# Patient Record
Sex: FEMALE | Race: WHITE
[De-identification: names, ages, dates, MRNs, and addresses within clinical notes are randomized per-mention and may not be internally consistent; named-entity substitution may affect disease eponyms.]

---

## 2018-09-17 ENCOUNTER — HOSPITAL ENCOUNTER (EMERGENCY)
Dept: HOSPITAL 97 - ER | Age: 23
Discharge: HOME | End: 2018-09-17
Payer: COMMERCIAL

## 2018-09-17 VITALS — SYSTOLIC BLOOD PRESSURE: 125 MMHG | DIASTOLIC BLOOD PRESSURE: 61 MMHG | TEMPERATURE: 98 F | OXYGEN SATURATION: 99 %

## 2018-09-17 DIAGNOSIS — O26.891: Primary | ICD-10-CM

## 2018-09-17 LAB
BUN BLD-MCNC: 10 MG/DL (ref 7–18)
GLUCOSE SERPLBLD-MCNC: 90 MG/DL (ref 74–106)
HCG SERPL-ACNC: (no result) MIU/ML (ref 1–3)
HCT VFR BLD CALC: 40.1 % (ref 36–45)
LYMPHOCYTES # SPEC AUTO: 1.9 K/UL (ref 0.7–4.9)
MCH RBC QN AUTO: 28.6 PG (ref 27–35)
MCV RBC: 84.1 FL (ref 80–100)
PMV BLD: 9.4 FL (ref 7.6–11.3)
POTASSIUM SERPL-SCNC: 4.1 MMOL/L (ref 3.5–5.1)
RBC # BLD: 4.76 M/UL (ref 3.86–4.86)

## 2018-09-17 PROCEDURE — 81003 URINALYSIS AUTO W/O SCOPE: CPT

## 2018-09-17 PROCEDURE — 84702 CHORIONIC GONADOTROPIN TEST: CPT

## 2018-09-17 PROCEDURE — 36415 COLL VENOUS BLD VENIPUNCTURE: CPT

## 2018-09-17 PROCEDURE — 99284 EMERGENCY DEPT VISIT MOD MDM: CPT

## 2018-09-17 PROCEDURE — 80048 BASIC METABOLIC PNL TOTAL CA: CPT

## 2018-09-17 PROCEDURE — 81025 URINE PREGNANCY TEST: CPT

## 2018-09-17 PROCEDURE — 76817 TRANSVAGINAL US OBSTETRIC: CPT

## 2018-09-17 PROCEDURE — 86901 BLOOD TYPING SEROLOGIC RH(D): CPT

## 2018-09-17 PROCEDURE — 96374 THER/PROPH/DIAG INJ IV PUSH: CPT

## 2018-09-17 PROCEDURE — 86900 BLOOD TYPING SEROLOGIC ABO: CPT

## 2018-09-17 PROCEDURE — 85025 COMPLETE CBC W/AUTO DIFF WBC: CPT

## 2018-09-17 NOTE — EDPHYS
Physician Documentation                                                                           

 [facility]                                                                

Name: Rahul Cole                                                                              

Age: 22 yrs                                                                                       

Sex: Female                                                                                       

: 1995                                                                                   

MRN: [id number]                                                                                   

Arrival Date: 2018                                                                          

Time: 10:50                                                                                       

Account#: [id number]                                                                            

Bed 13                                                                                            

Private MD: Out, [facility]                                                                    

ED Physician Talat Uriarte                                                                      

HPI:                                                                                              

                                                                                             

11:22 This 22 yrs old  Female presents to ER via Ambulatory with complaints of       kb  

      Abdominal Cramping - 6WK PG.                                                                

11:22 The patient presents to the emergency department with abdominal pain, of the right      kb  

      lower quadrant and left lower quadrant, that started this morning, described as crampy.     

      The estimated gestational age is 6 weeks. Pregnancy course: Prenatal care: private OB       

      physician, Leakage of Fluid: none appreciated, Ultrasound: the patient has not had an       

      ultrasound, Risk/complications: no obvious risks or complications are appreciated.          

      Previous pregnancies: the patient has never been pregnant. Associated signs and             

      symptoms: Pertinent positives: abdominal pain, nausea, Pertinent negatives: chest pain,     

      diarrhea, dysuria, fever, frequency, ruptured membranes, seizure, shortness of breath,      

      vaginal bleeding, vaginal discharge, vomiting. The patient has not experienced similar      

      symptoms in the past. The patient has not recently seen a physician.                        

                                                                                                  

OB/GYN:                                                                                           

10:56  1, Full Term 0, Premature 0,  0, Living 0, LMP 2018                 iw  

11:22  1,  0, Living 0, LMP 2018                                           kb  

                                                                                                  

Historical:                                                                                       

- Allergies:                                                                                      

10:58 NKA;                                                                                    iw  

- Home Meds:                                                                                      

10:58 None [Active];                                                                          iw  

- PMHx:                                                                                           

10:58 None;                                                                                   iw  

- PSHx:                                                                                           

10:58 Tonsillectomy;                                                                          iw  

                                                                                                  

- Immunization history:: Adult Immunizations not up to date.                                      

- Social history:: Smoking status: Patient/guardian denies using tobacco.                         

- Ebola Screening: : Patient negative for fever greater than or equal to 101.5 degrees            

  Fahrenheit, and additional compatible Ebola Virus Disease symptoms Patient denies               

  exposure to infectious person Patient denies travel to an Ebola-affected area in the            

  21 days before illness onset No symptoms or risks identified at this time.                      

                                                                                                  

                                                                                                  

ROS:                                                                                              

11:22 Constitutional: Negative for fever, chills, and weight loss, Cardiovascular: Negative   kb  

      for chest pain, palpitations, and edema, Respiratory: Negative for shortness of breath,     

      cough, wheezing, and pleuritic chest pain, Back: Negative for injury and pain, :          

      Negative for injury, bleeding, discharge, and swelling, MS/Extremity: Negative for          

      injury and deformity, Skin: Negative for injury, rash, and discoloration, Neuro:            

      Negative for headache, weakness, numbness, tingling, and seizure.                           

11:22 Abdomen/GI: Positive for abdominal pain, nausea, abdominal cramps, Negative for             

      vomiting, diarrhea, constipation, abdominal distension, anorexia.                           

                                                                                                  

Exam:                                                                                             

11:22 Constitutional:  This is a well developed, well nourished patient who is awake, alert,  kb  

      and in no acute distress. Head/Face:  Normocephalic, atraumatic. Chest/axilla:  Normal      

      chest wall appearance and motion.  Nontender with no deformity.  No lesions are             

      appreciated. Cardiovascular:  Regular rate and rhythm with a normal S1 and S2.  No          

      gallops, murmurs, or rubs.  Normal PMI, no JVD.  No pulse deficits. Respiratory:  Lungs     

      have equal breath sounds bilaterally, clear to auscultation and percussion.  No rales,      

      rhonchi or wheezes noted.  No increased work of breathing, no retractions or nasal          

      flaring. Back:  No spinal tenderness.  No costovertebral tenderness.  Full range of         

      motion. Skin:  Warm, dry with normal turgor.  Normal color with no rashes, no lesions,      

      and no evidence of cellulitis. MS/ Extremity:  Pulses equal, no cyanosis.                   

      Neurovascular intact.  Full, normal range of motion. Neuro:  Awake and alert, GCS 15,       

      oriented to person, place, time, and situation.  Cranial nerves II-XII grossly intact.      

      Motor strength 5/5 in all extremities.  Sensory grossly intact.  Cerebellar exam            

      normal.  Normal gait.                                                                       

11:22 Abdomen/GI: Inspection: abdomen appears normal, Bowel sounds: normal, in all quadrants,     

      Palpation: soft, in all quadrants, mild abdominal tenderness, in the suprapubic area,       

      right lower quadrant and left lower quadrant.                                               

                                                                                                  

Vital Signs:                                                                                      

10:56  / 94; Pulse 85; Resp 16; Temp 97.9; Pulse Ox 98% on R/A; Weight 95.71 kg; Height iw  

      5 ft. 7 in. (170.18 cm); Pain 5/10;                                                         

12:30  / 61; Pulse 78; Resp 18; Temp 98.0; Pulse Ox 99% on R/A;                         ph  

10:56 Body Mass Index 33.05 (95.71 kg, 170.18 cm)                                             iw  

                                                                                                  

MDM:                                                                                              

10:54 Patient medically screened.                                                             [facility] 

11:22 Data reviewed: vital signs, nurses notes. Data interpreted: Pulse oximetry: on room air kb  

      is 98 %. Interpretation: normal.                                                            

12:43 Counseling: I had a detailed discussion with the patient and/or guardian regarding: the kb  

      historical points, exam findings, and any diagnostic results supporting the                 

      discharge/admit diagnosis, lab results, radiology results, the need for outpatient          

      follow up, an OB/Gyne specialist, to return to the emergency department if symptoms         

      worsen or persist or if there are any questions or concerns that arise at home.             

                                                                                                  

                                                                                             

10:55 Order name: Quantitative Hcg; Complete Time: 12:09                                      kb  

                                                                                             

10:55 Order name: Abo/rh Typing; Complete Time: 12:40                                         kb  

                                                                                             

10:55 Order name: Basic Metabolic Panel; Complete Time: 12:09                                 kb  

                                                                                             

10:55 Order name: CBC with Diff; Complete Time: 11:33                                         kb  

                                                                                             

11:34 Order name: Urine Dipstick--Ancillary (enter results); Complete Time: 11:58             bd  

                                                                                             

11:34 Order name: Urine Pregnancy--Ancillary (enter results); Complete Time: 11:58            bd  

                                                                                             

10:55 Order name: Urine Pregnancy Test (obtain specimen); Complete Time: 11:11                kb  

                                                                                             

10:55 Order name: IV Saline Lock; Complete Time: 11:11                                        kb  

                                                                                             

10:55 Order name: Labs collected and sent; Complete Time: 11:11                               kb  

                                                                                             

10:55 Order name: NPO; Complete Time: 11:11                                                   kb  

                                                                                             

10:55 Order name: Urine Dipstick-Ancillary (obtain specimen); Complete Time: 11:11            kb  

                                                                                             

12:12 Order name: US Transvaginal Ob; Complete Time: 13:17                                    kb  

                                                                                                  

Administered Medications:                                                                         

11:25 Drug: Phenergan 12.5 mg Route: IVP; Site: right antecubital;                            ph  

13:10 Follow up: Response: No adverse reaction; Nausea is decreased                           ph  

                                                                                                  

                                                                                                  

Disposition:                                                                                      

14:53 Co-signature as Attending Physician, Talat Uriarte MD I agree with the assessment and  [facility] 

      plan of care.                                                                               

                                                                                                  

Disposition:                                                                                      

18 12:45 Discharged to Home. Impression: Less than 8 weeks gestation of pregnancy.          

- Condition is Stable.                                                                            

- Discharge Instructions: First Trimester of Pregnancy, Easy-to-Read.                             

- Prescriptions for Diclegis 10- 10 mg Oral tablet,delayed release (DR/EC) - take 1               

  tablet by ORAL route once daily; 10 tablet.                                                     

- Medication Reconciliation Form, Thank You Letter, Antibiotic Education, Prescription            

  Opioid Use form.                                                                                

- Follow up: Private Physician; When: 2 - 3 days; Reason: Recheck today's complaints,             

  Continuance of care, Re-evaluation by your physician. Follow up: Emergency                      

  Department; When: As needed; Reason: Worsening of condition.                                    

                                                                                                  

                                                                                                  

                                                                                                  

Signatures:                                                                                       

Dispatcher [employer]                           EDMS                                                 

Jackie Lozada, FNP-C                 FNP-Talat Ledezma MD MD cha Williams, Irene, SERGE                     RN                                                      

Iesha Feliz RN                      RN                                                      

                                                                                                  

Corrections: (The following items were deleted from the chart)                                    

13:11 12:45 2018 12:45 Discharged to Home. Impression: Less than 8 weeks gestation of   ph  

      pregnancy. Condition is Stable. Discharge Instructions: First Trimester of Pregnancy,       

      Easy-to-Read. Forms are Medication Reconciliation Form, Thank You Letter, Antibiotic        

      Education, Prescription Opioid Use. Follow up: Private Physician; When: 2 - 3 days;         

      Reason: Recheck today's complaints, Continuance of care, Re-evaluation by your              

      physician. Follow up: Emergency Department; When: As needed; Reason: Worsening of           

      condition. kb                                                                               

                                                                                                  

**************************************************************************************************

## 2018-09-17 NOTE — ER
Nurse's Notes                                                                                     

 [facility]                                                                

Name: Rahul Cole                                                                              

Age: 22 yrs                                                                                       

Sex: Female                                                                                       

: 1995                                                                                   

MRN: [id number]                                                                                   

Arrival Date: 2018                                                                          

Time: 10:50                                                                                       

Account#: [id number]                                                                            

Bed 13                                                                                            

Private MD: Out, [facility]                                                                    

Diagnosis: Less than 8 weeks gestation of pregnancy                                               

                                                                                                  

Presentation:                                                                                     

                                                                                             

10:56 Presenting complaint: Patient states: is approx 6 weeks pregnant, started having lower  iw  

      abd cramping and nausea this morning X 2 hours, denies vaginal bleeding. Transition of      

      care: patient was not received from another setting of care. Onset of symptoms was          

      2018. Risk Assessment: Do you want to hurt yourself or someone else?          

      Patient reports no desire to harm self or others. Initial Sepsis Screen: Does the           

      patient meet any 2 criteria? No. Patient's initial sepsis screen is negative. Does the      

      patient have a suspected source of infection? No. Patient's initial sepsis screen is        

      negative. Care prior to arrival: None.                                                      

10:56 Method Of Arrival: Ambulatory                                                           iw  

10:56 Acuity: MALATHI 3                                                                           iw  

                                                                                                  

OB/GYN:                                                                                           

10:56  1, Full Term 0, Premature 0,  0, Living 0, LMP 2018                 iw  

11:22  1,  0, Living 0, LMP 2018                                           kb  

                                                                                                  

Historical:                                                                                       

- Allergies:                                                                                      

10:58 NKA;                                                                                    iw  

- Home Meds:                                                                                      

10:58 None [Active];                                                                          iw  

- PMHx:                                                                                           

10:58 None;                                                                                   iw  

- PSHx:                                                                                           

10:58 Tonsillectomy;                                                                          iw  

                                                                                                  

- Immunization history:: Adult Immunizations not up to date.                                      

- Social history:: Smoking status: Patient/guardian denies using tobacco.                         

- Ebola Screening: : Patient negative for fever greater than or equal to 101.5 degrees            

  Fahrenheit, and additional compatible Ebola Virus Disease symptoms Patient denies               

  exposure to infectious person Patient denies travel to an Ebola-affected area in the            

  21 days before illness onset No symptoms or risks identified at this time.                      

                                                                                                  

                                                                                                  

Screenin:11 Abuse screen: Denies threats or abuse. Denies injuries from another. Nutritional        ph  

      screening: No deficits noted. Tuberculosis screening: No symptoms or risk factors           

      identified. Fall Risk None identified.                                                      

                                                                                                  

Assessment:                                                                                       

11:12 General: Appears in no apparent distress. comfortable, well groomed, Behavior is calm,  ph  

      cooperative, appropriate for age. Pain: Complains of pain in suprapubic area. Neuro:        

      Level of Consciousness is awake, alert, obeys commands, Oriented to person, place,          

      time, situation. Cardiovascular: Capillary refill < 3 seconds Patient's skin is warm        

      and dry. Respiratory: Airway is patent Respiratory effort is even, unlabored. GI:           

      Abdomen is non-distended, obese, Bowel sounds present X 4 quads. Abd is soft and non        

      tender X 4 quads. Reports lower abdominal pain, nausea, Patient currently denies            

      constipation, diarrhea, vomiting. : Reports cramping, Denies discharge, vaginal           

      bleeding. Derm: Skin is intact, is healthy with good turgor, Skin is pink, warm \T\ dry.    

      Musculoskeletal: Circulation, motion, and sensation intact. Range of motion: intact in      

      all extremities.                                                                            

12:30 Reassessment: Patient appears in no apparent distress at this time. Patient and/or      ph  

      family updated on plan of care and expected duration. Pain level reassessed. Patient is     

      alert, oriented x 3, equal unlabored respirations, skin warm/dry/pink. Pt resting           

      quietly, awaiting US results.                                                               

13:10 Reassessment: Patient appears in no apparent distress at this time. Patient and/or      ph  

      family updated on plan of care and expected duration. Pain level reassessed. Patient is     

      alert, oriented x 3, equal unlabored respirations, skin warm/dry/pink. Pt d/c home.         

                                                                                                  

Vital Signs:                                                                                      

10:56  / 94; Pulse 85; Resp 16; Temp 97.9; Pulse Ox 98% on R/A; Weight 95.71 kg; Height iw  

      5 ft. 7 in. (170.18 cm); Pain 5/10;                                                         

12:30  / 61; Pulse 78; Resp 18; Temp 98.0; Pulse Ox 99% on R/A;                         ph  

10:56 Body Mass Index 33.05 (95.71 kg, 170.18 cm)                                             iw  

                                                                                                  

ED Course:                                                                                        

10:50 Patient arrived in ED.                                                                  sb2 

10:50 [facility]                                                        kb  

10:50 Talat Uriarte MD is Attending Physician.                                             kb  

10:50 Out, [facility] is Private Physician.                                                      sb2 

10:56 Iesha Feliz RN is Primary Nurse.                                                    ph  

10:57 Triage completed.                                                                       iw  

10:58 Arm band placed on.                                                                     iw  

11:11 Patient has correct armband on for positive identification. Bed in low position. Call   ph  

      light in reach. Side rails up X 1. Pulse ox on. NIBP on. Warm blanket given.                

11:17 Initial lab(s) drawn, by me, sent to lab. Inserted saline lock: 20 gauge in right       dh3 

      antecubital area, using aseptic technique. Blood collected.                                 

11:21 Urine collected: clean catch specimen, clear.                                           dh3 

12:31 US Transvaginal Ob In Process Unspecified.                                              EDMS

12:48 Ultrasound completed. Patient tolerated well. Patient moved back from ultrasound.       lc3 

13:02 No provider procedures requiring assistance completed. IV discontinued, intact,         ph  

      bleeding controlled, No redness/swelling at site. Pressure dressing applied.                

                                                                                                  

Administered Medications:                                                                         

11:25 Drug: Phenergan 12.5 mg Route: IVP; Site: right antecubital;                            ph  

13:10 Follow up: Response: No adverse reaction; Nausea is decreased                           ph  

                                                                                                  

                                                                                                  

Outcome:                                                                                          

12:45 Discharge ordered by MD.                                                                kb  

13:10 Discharged to home ambulatory, with significant other.                                  ph  

13:10 Condition: good                                                                             

13:10 Discharge instructions given to patient, Instructed on discharge instructions, follow       

      up and referral plans. medication usage, Demonstrated understanding of instructions,        

      follow-up care, medications, Prescriptions given X 1.                                       

13:11 Patient left the ED.                                                                    ph  

                                                                                                  

Signatures:                                                                                       

Dispatcher [employer]                           EDMS                                                 

Jackie Lozada, FNP-C                 FNP-Ckb                                                   

Efra Carrion RN RN   sg                                                   

Anabelle Cooper RN RN                                                      

Iesha Feliz RN                      RN                                                      

Yusra Weiss Deanna                              [facility]                                                  

Patti Hopson2                                                  

                                                                                                  

Corrections: (The following items were deleted from the chart)                                    

10:58 10:56 Temp 97.9F; sg                                                                    iw  

                                                                                                  

**************************************************************************************************

## 2018-09-17 NOTE — RAD REPORT
EXAM DESCRIPTION:  US - Transvaginal OB - 9/17/2018 12:43 pm

 

CLINICAL HISTORY:  ABD CRAMPING, PREGNANT

 

COMPARISON:  No comparisons

 

FINDINGS:  A single gestational sac is seen within the uterus. The shape of the sac is within normal 
limits for gestational age. Within the sac is a single fetal pole with crown-rump length of 3 mm, cor
relating to estimated gestational age of 5 weeks 6 days. Estimated date of delivery is 05/13/2019.

 

Heart rate is 123 BPM.

 

The placenta is not yet developed due to early gestational age. 6 mm inferior subchorionic bleed note
d.

 

The maternal adnexa and ovaries are within normal limits. Normal Doppler blood flow was demonstrated 
to both ovaries.

 

 

IMPRESSION:  Single live early intrauterine gestation with estimated gestational age of 5 weeks 6 day
s, PATRICE 05/13/2019.

 

6 mm inferior subchorionic bleed noted.